# Patient Record
(demographics unavailable — no encounter records)

---

## 2025-04-21 NOTE — REASON FOR VISIT
[Initial Visit] : an initial pain management visit xerostomia, resolving (Lithium) xerostomia, resolving (Lithium) xerostomia, GI upset from lithium

## 2025-04-21 NOTE — PHYSICAL EXAM
[de-identified] : Lumbar spine exam: Inspection: erythema (-) ecchymosis (-)    Palpation:  Midline lumbar tenderness: (-) paraspinal tenderness: L (-) ; R (-)   Special Tests:  SLR: R (-) ; L (+)  Facet loading: R (-) ; L (-)  ZAINAB test: R (-) ; L (-) Gaenslen's: R (-) ; L (-)  Compression test: R (-) ; L (-)      Gait:  non- antalgic gait

## 2025-04-21 NOTE — DISCUSSION/SUMMARY
[de-identified] : A discussion regarding available pain management treatment options occurred with the patient. These included interventional, rehabilitative, pharmacological, and alternative modalities. We will proceed with the following:   Interventional treatment options: 1.  Patient will proceed with a left L4-5 TFESI. Treatment options were discussed with the patient. The patient has been having persistent lower back and lumbar radicular pain with minimal improvement with conservative therapies. The patient was given the option to proceed with a lumbar transforaminal epidural steroid injection to try to get some pain relief.        The risks and benefits were discussed which included bleeding, infection, nerve injury, no pain relief or worse, increased pain. All questions were answered and concerns addressed.   Rehabilitative options: -Participation in active HEP was discussed and printed.  Patient given specific exercises to do including side plank, Quadruped arm/leg raise, Extension exercise, and Glut Bridges. - c/w chiro.   Medication based treatment options: - ordered Meloxicam 15mg daily for 4 weeks. Discussed risks and benefits. Avoid taking for any side effects. -Patient is aware to take for 2 weeks straight than take 1 week off before repeating.   Complementary treatment options: - Patient was advised to stay away from any heavy lifting. If needed, he was advised to squat and not bend forward. - Initiate physician directed activity and lifestyle modifications.  Follow up 1-2 weeks post injection for assessment of efficacy and further recommendations.  I, Quoc Greco, attest that this documentation has been prepared under the direction and in the presence of Provider David Dee, DO The documentation recorded by the scribe, in my presence, accurately reflects the service I personally performed, and the decisions made by me with my edits as appropriate.   Best Regards, David Dee D.O.

## 2025-04-21 NOTE — HISTORY OF PRESENT ILLNESS
[FreeTextEntry1] : HISTORY OF PRESENT ILLNESS: Mr. Gallo is a 51-year-old male complaining of lower/mid back pain. The pain started after an injury sustained at work on 2/15/2025.  He is a  employed by the local  as a . Pt states while working as a  he was removing pipes from a sprinkler system and his co-worker dropped a pipe onto his head. He denies LOC, dizziness and headaches but is now c/o sharp pain in the mid to lower back. The patient has had this pain for months.  Patient describes the pain as moderate to severe.  During the last month the pain has been nearly constant with symptoms worsening in no typical pattern.  He has been attending sessions of provider guided physical therapy for the last 2 weeks with no significant relief.  MRI lumbar demonstrates L4-5 annular tear associated with bilateral moderate-severe neuroforaminal stenosis; he has predominantly myofascial axial back pain with left-sided radiculopathy. He is currently participating in physical therapy and chiropractic therapy.   ACTIVITIES: Patient uses no assistive walking device at this time.  Patient has difficulty performing household chores, going to work, doing yardwork or shopping, participating in recreational activities & exercise at this time.   Prior Pain Medications: Ibuprofen, Tylenol.

## 2025-05-19 NOTE — HISTORY OF PRESENT ILLNESS
[FreeTextEntry1] : HISTORY OF PRESENT ILLNESS: Mr. Gallo is a 51-year-old male complaining of lower/mid back pain. The pain started after an injury sustained at work on 2/15/2025.  He is a  employed by the local  as a . Pt states while working as a  he was removing pipes from a sprinkler system and his co-worker dropped a pipe onto his head. He denies LOC, dizziness and headaches but is now c/o sharp pain in the mid to lower back. The patient has had this pain for months.  Patient describes the pain as moderate to severe.  During the last month the pain has been nearly constant with symptoms worsening in no typical pattern.  He has been attending sessions of provider guided physical therapy for the last 2 weeks with no significant relief.  MRI lumbar demonstrates L4-5 annular tear associated with bilateral moderate-severe neuroforaminal stenosis; he has predominantly myofascial axial back pain with left-sided radiculopathy. He is currently participating in physical therapy and chiropractic therapy.   ACTIVITIES: Patient uses no assistive walking device at this time.  Patient has difficulty performing household chores, going to work, doing yardwork or shopping, participating in recreational activities & exercise at this time.   Prior Pain Medications: Ibuprofen, Tylenol.  5-: Revisit encounter. He is accompanied by his family member today.   He is presenting today with ongoing severe pain in the lower back. His pain is severe and rated at a 10/10 on the pain scale. He is up every night crying in severe pain. The pain is mainly on the left side with radicular feature into the left lower extremity. His pain is made worse with walking for prolonged periods of time. He has to sit down to relieve the pain. His pain is present daily and limits his ADLs.

## 2025-05-19 NOTE — PHYSICAL EXAM
[de-identified] : Lumbar spine exam: Inspection: erythema (-) ecchymosis (-)    Palpation:  Midline lumbar tenderness: (-) paraspinal tenderness: L (-) ; R (-)   Special Tests:  SLR: R (-) ; L (+)  Facet loading: R (-) ; L (-)  ZAINAB test: R (-) ; L (-) Gaenslen's: R (-) ; L (-)  Compression test: R (-) ; L (-)      Gait:  non- antalgic gait

## 2025-05-19 NOTE — DISCUSSION/SUMMARY
[de-identified] : A discussion regarding available pain management treatment options occurred with the patient. These included interventional, rehabilitative, pharmacological, and alternative modalities. We will proceed with the following:   Interventional treatment options: 1. Proceed with a Left L4-5 transforaminal epidural steroid injection under fluoroscopic guidance. (VALIUM TO BE USED) Treatment options were discussed. The patient has been having persistent, severe low back pain and lumbar radicular pain that has minimally improved with conservative therapy thus far (including physical therapy, home stretching and anti-inflammatory medications. The patient was given the option of proceeding with a lumbar epidural steroid injection to try and get the patient some pain relief. The risks and benefits were discussed, which included the associated problems with steroids, bleeding, nerve injury, lack of improvement with pain, and 0.5% chance of a post dural puncture headache.    Rehabilitative options: -Participation in active HEP was discussed and printed.  Patient given specific exercises to do including side plank, Quadruped arm/leg raise, Extension exercise, and Glut Bridges. - Continue with chiropractic therapy.    Medication based treatment options: - Continue with Meloxicam 15 mg qD prn (not due for refill) 1. Ordered Diazepam 10 mg 1 tablet to be taken 30 minutes prior to OSMEL.    Complementary treatment options: - Patient was advised to stay away from any heavy lifting. If needed, he was advised to squat and not bend forward. - Initiate physician directed activity and lifestyle modifications.  - Follow up 2 weeks post injection.   I, Stephany Mancera, attest that this documentation has been prepared under the direction and in the presence of Provider David Dee, DO  The documentation recorded by the scribe, in my presence, accurately reflects the service I personally performed, and the decisions made by me with my edits as appropriate.   Best Regards,  David Dee D.O.

## 2025-05-28 NOTE — PROCEDURE
[FreeTextEntry3] : Date of Service: 05/28/2025   Account: [id number]  Patient: STANLEY CORCORAN   YOB: 1973  Age: 51 year   Pre-Operative Diagnosis:   Lumbosacral Radiculopathy (M54.16)  Post Operative Diagnosis: Lumbosacral Radiculopathy (M54.16)  Procedure:             Left L4-L5 transforaminal epidural steroid injection under fluoroscopic guidance.  Anesthesia:            none  This procedure was carried out using fluoroscopic guidance.  The risks and benefits of the procedure were discussed extensively with the patient.  The consent of the patient was obtained and the following procedure was performed. The patient was placed in the prone position on the fluoroscopy table and the area was prepped and draped in a sterile fashion.  A timeout was performed with all essential staff present and the site and side were verified.  The left L4-L5 neural foramen was then identified on left oblique "nelly dog" anatomical view at the 6 o' clock position using fluoroscopic guidance, and the area was marked.   A 25 gauge 3.5 inch spinal needle was directed toward the inferior (6 o'clock) position of the pedicle, which formed the roof of the identified foramen.  Once in the epidural space, after negative aspiration for heme and CSF, 1cc of Omnipaque contrast was injected to confirm epidural location and assess filling defects and rule out intravascular needle placement.  Lumbar epidurogram showed no intravascular or intrathecal flow pattern.  No blood or CSF was aspirated.  Omnipaque spread medially in epidural space and outlined the exiting nerve root.  After this, 3 cc of a mixture of 2cc of preservative free normal saline plus 10mg dexamethasone was injected in the epidural space  Lumbar epidurogram showed no intravascular or intrathecal flow pattern.  No blood or CSF was aspirated.  Omnipaque spread medially in epidural space and outlined the exiting nerve root.   The needle was subsequently removed.  Vital signs remained normal.  Pulse oximeter was used throughout the procedure and the patient's pulse and oxygen saturation remained within normal limits.  The patient tolerated the procedure well.  There were no complications.  The patient was instructed to apply ice over the injection sites for twenty minutes every two hours for the next 24 to 48 hours.  Disposition:       1. The patient was advised to F/U in 1-2 weeks to assess the response to the injection.      2. The patient was also instructed to contact me immediately if there were any concerns related to the procedure performed.

## 2025-06-03 NOTE — ASSESSMENT
[FreeTextEntry1] : We had a thorough discussion regarding his condition and MRI findings. I have recommended continuation of conservative management to include PT, home stretching, medical management under the surpervision of pain management, and additional interventional procedures (ESIs, Lumbar MBB / RFA etc). At this time, I do not feel structural spine surgery would be beneficial. He may return to the office as needed. Will call barring any issues. Will call barring any issues.    I personally reviewed with the PA, this patient's history and physical exam findings, as documented above. I have discussed the relevant areas of concern, having direct implications to the presenting problems and illnesses, and I have personally examined all pertinent and positive and negative findings, which impact their neurosurgical treatment.  MS JIM Borden-C Senior Physician Assistant [facility]  David Rivera MD FAANS Director, Complex & Adult Spinal Deformity Surgery [facility]

## 2025-06-03 NOTE — HISTORY OF PRESENT ILLNESS
[FreeTextEntry1] : Mr. CORCORAN was involved in a work-related injury in February 2025. Since that time he reports persistent left sided back pain, no radiculopathy.   He has trialed PT and medical management with a Medrol dose pack, Meloxicam PRN, and Voltaren gel without improvement.   Recently he had a left L4/5 OSMEL with Dr. Dee. At this point he reports no relief.   No bowel / bladder dysfunction.  IMAGING:  - MRI lumbar: alignment and disc heights maintained. Annular tear on the left at L4/5 with disc bulging.   PHYSICAL EXAM:  Constitutional: Well appearing, no distress, appears frustrated with regard to pain.  HEENT: Normocephalic Atraumatic Psychiatric: Alert and oriented to all spheres, normal mood Pulmonary: No respiratory distress Neurologic:  CN II-XII grossly intact Palpation: + tenderness / spasms left lumbar paraspinals. SI joint nontender.  Strength: Full strength in all major muscle groups, however pain inhibited left hip flexor appreciated.  Sensation: Full sensation to light touch in all extremities Reflexes:   2+ patellar  2+ ankle jerk Restriction in ROM LS spine.  Signs: SLR on the left increases his left sided back pain, he denies hip / groin pain or neuropathic leg pain.  Gait: steady, walking w/o assistance.

## 2025-07-01 NOTE — DISCUSSION/SUMMARY
[de-identified] : A discussion regarding available pain management treatment options occurred with the patient. These included interventional, rehabilitative, pharmacological, and alternative modalities. We will proceed with the following:   Interventional treatment options: -referred to another pain management DR in order to use anesthesia for injection   Rehabilitative options: -deferred at this   Medication based treatment options: Ordered Methylprednisolone 21-tablet, 6-day taper pack. - I advised that the steroid should be taken with food. In addition, while taking the prescribed steroid, no over the counter or other NSAIDs should be used, such as ibuprofen (Motrin or Advil) or naproxen (Aleve) as this can cause stomach upset or other side effects. If needed for fever or breakthrough pain Tylenol can be used.  - Follow up as needed  I, Reese Perez, attest that this documentation has been prepared under the direction and in the presence of Provider David Dee, DO  The documentation recorded by the scribe, in my presence, accurately reflects the service I personally performed, and the decisions made by me with my edits as appropriate.   Best Regards,  David Dee D.O.

## 2025-07-01 NOTE — HISTORY OF PRESENT ILLNESS
[FreeTextEntry1] :   07/01/2025 REVISIT ENCOUNTER  Today, he is experiencing muscle spasms on his upper and lower back left side. He rates his pain a 8/10.